# Patient Record
Sex: FEMALE | Race: WHITE | NOT HISPANIC OR LATINO
[De-identification: names, ages, dates, MRNs, and addresses within clinical notes are randomized per-mention and may not be internally consistent; named-entity substitution may affect disease eponyms.]

---

## 2017-12-11 ENCOUNTER — HOSPITAL (OUTPATIENT)
Dept: OTHER | Age: 72
End: 2017-12-11
Attending: FAMILY MEDICINE

## 2024-02-20 ENCOUNTER — APPOINTMENT (OUTPATIENT)
Dept: CT IMAGING | Facility: HOSPITAL | Age: 79
End: 2024-02-20
Attending: EMERGENCY MEDICINE
Payer: MEDICARE

## 2024-02-20 ENCOUNTER — APPOINTMENT (OUTPATIENT)
Dept: GENERAL RADIOLOGY | Facility: HOSPITAL | Age: 79
End: 2024-02-20
Payer: MEDICARE

## 2024-02-20 PROCEDURE — 72125 CT NECK SPINE W/O DYE: CPT | Performed by: EMERGENCY MEDICINE

## 2024-02-20 PROCEDURE — 99284 EMERGENCY DEPT VISIT MOD MDM: CPT

## 2024-02-20 PROCEDURE — 70486 CT MAXILLOFACIAL W/O DYE: CPT | Performed by: EMERGENCY MEDICINE

## 2024-02-20 PROCEDURE — 99285 EMERGENCY DEPT VISIT HI MDM: CPT

## 2024-02-20 PROCEDURE — 70450 CT HEAD/BRAIN W/O DYE: CPT | Performed by: EMERGENCY MEDICINE

## 2024-02-21 ENCOUNTER — HOSPITAL ENCOUNTER (EMERGENCY)
Facility: HOSPITAL | Age: 79
Discharge: HOME | End: 2024-02-21
Attending: EMERGENCY MEDICINE
Payer: MEDICARE

## 2024-02-21 VITALS
HEIGHT: 67.75 IN | HEART RATE: 85 BPM | DIASTOLIC BLOOD PRESSURE: 74 MMHG | BODY MASS INDEX: 29.13 KG/M2 | SYSTOLIC BLOOD PRESSURE: 136 MMHG | RESPIRATION RATE: 18 BRPM | TEMPERATURE: 98 F | WEIGHT: 190 LBS | OXYGEN SATURATION: 96 %

## 2024-02-21 DIAGNOSIS — T07.XXXA ABRASIONS OF MULTIPLE SITES: ICD-10-CM

## 2024-02-21 DIAGNOSIS — S02.2XXD CLOSED FRACTURE OF NASAL BONE WITH ROUTINE HEALING, SUBSEQUENT ENCOUNTER: Primary | ICD-10-CM

## 2024-02-21 DIAGNOSIS — S09.90XA INJURY OF HEAD, INITIAL ENCOUNTER: ICD-10-CM

## 2024-02-21 NOTE — DISCHARGE INSTRUCTIONS
Your CT scan does show a fractured nose.  You can follow-up with your ENT.  Recommend Tylenol or Motrin for pain.    Ice 3 times daily

## 2024-02-21 NOTE — ED PROVIDER NOTES
Patient Seen in: [facility]      History     Chief Complaint   Patient presents with    Fall    Head Neck Injury     Stated Complaint: fall/nose injury    Subjective:   HPI    Patient is 79-year-old female presents emergency room for evaluation of head and nasal injury.  Patient states she recently broke her nose and had it set surgically a couple weeks ago.  Patient states she tripped over a broom stick tonight and fell and hit her head and nose.  Patient denies nausea or vomiting.  Patient denies blood thinners.  Some mild neck pain.  Already had imaging prior to my evaluation.  No vision changes.  No chest or abdominal pain    Objective:   Past Medical History:   Diagnosis Date    Nasal fracture               History reviewed. No pertinent surgical history.             Social History     Socioeconomic History    Marital status:    Tobacco Use    Smoking status: Unknown   Substance and Sexual Activity    Alcohol use: Yes              Review of Systems    Positive for stated complaint: fall/nose injury  Other systems are as noted in HPI.  Constitutional and vital signs reviewed.      All other systems reviewed and negative except as noted above.    Physical Exam     ED Triage Vitals [02/20/24 2216]   /76   Pulse 90   Resp 18   Temp 97.8 °F (36.6 °C)   Temp src Oral   SpO2 96 %   O2 Device None (Room air)       Current:/74   Pulse 85   Temp 97.8 °F (36.6 °C) (Oral)   Resp 18   Ht 172.1 cm (5' 7.75\")   Wt 86.2 kg   SpO2 96%   BMI 29.10 kg/m²         Physical Exam    CONSTITUTIONAL: Well-appearing, no acute distress  HEAD: Normocephalic.  Patient has a frontal forehead abrasion.  She has abrasions to her nose.  Swelling to the nose.  Dried blood in the nares.  Mild tenderness right superior orbital region.  Pupils equal round and reactive to light commendation  NECK: Supple and nontender.  No midline cervical spine tenderness  CHEST: Nontender without ecchymosis  or abrasion.  ABDOMINAL: Nontender, soft  HEENT    ED Course   Labs Reviewed - No data to display          CT SPINE CERVICAL (CPT=72125)    Result Date: 2/21/2024  PROCEDURE:  CT SPINE CERVICAL (CPT=72125)  COMPARISON:  None.  INDICATIONS:  fall/nose injury, neck pain  TECHNIQUE:  Noncontrast CT scanning of the cervical spine is performed from the skull base through C7.  Multiplanar reconstructions are generated.  Dose reduction techniques were used. Dose information is transmitted to the ACR (American College of Radiology) NRDR (National Radiology Data Registry) which includes the Dose Index Registry.  PATIENT STATED HISTORY: (As transcribed by Technologist)  second fall in two weeks    FINDINGS:  CRANIOCERVICAL AREA:  Normal foramen magnum with no Chiari malformation. PARASPINAL AREA:  Normal with no visible mass.  BONES:  There are degenerative changes of the C1-2 articulation right greater than left.  No acute fracture.  Mild dextroscoliosis.  Multiple level uncinate arthritis.  There is mild straightening of the cervical lordosis.  There is minimal retrolisthesis of C4 on 5 and trace anterior spondylolisthesis C7 on T1.  CERVICAL DISC LEVELS: There is moderate degenerative disc disease with endplate osteophytes annular bulging disc and osteophyte complexes C4-5 through C5-6 and a slightly less extent C3-4.  There is facet arthropathy at multiple levels most severe C3-4, C5-6, and C7-T1.  There is moderate foraminal narrowing C4-5 C5-6 and C6-7.  Mild canal stenosis C4-5.  No traumatic disc herniation noted.            CONCLUSION:  Moderate to severe multilevel cervical spondylosis without fracture.    LOCATION:  [facility]   Dictated by (CST): Silviano Valverde MD on 2/21/2024 at 0:28 AM     Finalized by (CST): Silviano Valverde MD on 2/21/2024 at 0:32 AM       I personally reviewd CT images of brain and independent interpretation shows no acute bleed.  I also viewed formal radiology report as read by radiology  with findings below:      CT BRAIN OR HEAD (28547)    Result Date: 2/21/2024  PROCEDURE:  CT BRAIN OR HEAD (92466)  COMPARISON:  None.  INDICATIONS:  fall/nose injury  TECHNIQUE:  Noncontrast CT scanning is performed through the brain. Dose reduction techniques were used. Dose information is transmitted to the ACR (American College of Radiology) NRDR (National Radiology Data Registry) which includes the Dose Index Registry.  PATIENT STATED HISTORY: (As transcribed by Technologist)  second fall in two weeks    FINDINGS:  VENTRICLES/SULCI:  Mild atrophy. INTRACRANIAL:  There is minimal chronic microvascular disease in the periventricular deep white matter.  There are no abnormal extraaxial fluid collections.  There is no midline shift.  There are no acute intraparenchymal brain abnormalities.  There is nothing specific for acute infarct.  There is no hemorrhage or mass lesion.  SINUSES:           No sign of acute sinusitis.  MASTOIDS:          No sign of acute inflammation. SKULL:             No evidence for fracture or osseous abnormality.  There is a small to moderate-sized right frontal scalp hematoma. OTHER:             There is a minimally depressed right nasal ala are fracture with soft tissue swelling.  Probable buckle fracture of the mid nasal septum.  There is blood/fluid within the nasal cavity with some fluid in the posterior left nasopharynx.            CONCLUSION:  1. No intracranial hemorrhage.  No infarct.  Minimal chronic microvascular disease. 2. Right frontal scalp hematoma with some preseptal soft tissue swelling on the right the globes are intact. 3.  There is a minimally depressed right nasal ala are fracture with soft tissue swelling.  Probable buckle fracture of the mid nasal septum.     LOCATION:  [facility]   Dictated by (CST): Silviano Valverde MD on 2/21/2024 at 0:24 AM     Finalized by (CST): Silviano Valverde MD on 2/21/2024 at 0:28 AM       CT FACIAL BONES (CPT=70486)    Result Date:  2/21/2024  PROCEDURE:  CT FACIAL BONES (CPT=70486)  COMPARISON:  None.  INDICATIONS:  fall/nose injury  TECHNIQUE:  Noncontrast CT scanning is performed through the facial bones. 3D shaded surface renderings are created on an independent CT scanner workstation. Dose reduction techniques were used. Dose information is transmitted to the ACR (American College of Radiology) NRDR (National Radiology Data Registry) which includes the Dose Index Registry.  3-D RENDERING:   PATIENT STATED HISTORY:(As transcribed by Technologist)  second fall in 2 weeks    FINDINGS:  SINUSES:  No visible mass, significant fluid or mucosal thickening.  NASAL FOSSA:  Minimally depressed right nasal a lower fracture.  There is mild S-shaped deviation of the nasal septum.  There is a small buckle fracture involving the mid bony nasal septum with adjacent soft tissue swelling.  SKULL BASE:  No mass or bone destruction.  FACIAL BONES:  No bony lesion or fracture.  There is a small central and right of midline frontal scalp hematoma with a small amount of right-sided medial preseptal soft tissue swelling.  ORBITS:  No visible mass, hematoma, edema or fracture.  CAVERNOUS SINUS:  Symmetric appearance with no visible lesion.  SALIVARY GLANDS:  The parotid and submandibular glands are unremarkable.  OTHER:  The nasopharynx, oropharynx, and oral cavity are unremarkable.  No lymphadenopathy.             CONCLUSION:  1. There is a small frontal scalp hematoma with the way right-sided preseptal soft tissue swelling.  The globes are intact.  No orbital floor fracture. 2. Minimally depressed right nasal bone fracture is with small probable fracture of the mid nasal septum.  The sinuses are clear.    LOCATION:  [facility]   Dictated by (CST): Silviano Valverde MD on 2/21/2024 at 0:20 AM     Finalized by (CST): Silviano Valverde MD on 2/21/2024 at 0:24 AM               MDM      Patient is a 79-year-old female presents emergency room for evaluation of head  injury with headache, nasal pain and mild neck pain.  Differential clues intracranial bleed, nasal fracture, cervical spine fracture.  Head CT shows no evidence for intracranial bleed.  She does have a nasal bone fracture.  Cervical spine x-rays show no evidence for fracture.  Patient was advised to follow-up with her ENT specialist.  Recommend Tylenol, ice 3 times daily.      Patient was screened and evaluated during this visit.   As a treating physician attending to the patient, I determined, within reasonable clinical confidence and prior to discharge, that an emergency medical condition was not or was no longer present.  There was no indication for further evaluation, treatment or admission on an emergency basis.  Comprehensive verbal and written discharge and follow-up instructions were provided to help prevent relapse or worsening.  Patient was instructed to follow-up with her primary care provider for further evaluation and treatment, but to return immediately to the ER for worsening, concerning, new, changing or persisting symptoms.  I discussed the case with the patient and they had no questions, complaints, or concerns.  Patient felt comfortable going home.                                 MDM    Disposition and Plan     Clinical Impression:  1. Closed fracture of nasal bone with routine healing, subsequent encounter    2. Injury of head, initial encounter    3. Abrasions of multiple sites         Disposition:  Discharge  2/21/2024  1:19 am    Follow-up:  David Thompson [address] [address] [phone]    Follow up  As needed          Medications Prescribed:  Discharge Medication List as of 2/21/2024  1:23 AM

## 2024-02-21 NOTE — ED INITIAL ASSESSMENT (HPI)
Pt to ED for c/o pain to face and nose S/P fall 1 hr PTA. Pt states she tripped on a broomstick and fell face front. Pt presents with swelling and abrasions to forehead and nose with ice pack applied and bruising and rug burn to right elbow. Pt presents with right arm short arm cast - Pt states she was recently in the ED S/P fall, \"had a broken arm and broken nose.\" Pt denies LOC, take Baby ASA daily. Pt admits drinking vodka tonight PTA.